# Patient Record
Sex: FEMALE | Race: WHITE | NOT HISPANIC OR LATINO | Employment: OTHER | URBAN - METROPOLITAN AREA
[De-identification: names, ages, dates, MRNs, and addresses within clinical notes are randomized per-mention and may not be internally consistent; named-entity substitution may affect disease eponyms.]

---

## 2017-12-12 ENCOUNTER — CLINICAL SUPPORT (OUTPATIENT)
Dept: SPEECH THERAPY | Facility: HOSPITAL | Age: 77
End: 2017-12-12
Attending: OTOLARYNGOLOGY
Payer: MEDICARE

## 2017-12-12 ENCOUNTER — OFFICE VISIT (OUTPATIENT)
Dept: OTOLARYNGOLOGY | Facility: CLINIC | Age: 77
End: 2017-12-12
Payer: MEDICARE

## 2017-12-12 VITALS — HEART RATE: 77 BPM | DIASTOLIC BLOOD PRESSURE: 71 MMHG | WEIGHT: 119.25 LBS | SYSTOLIC BLOOD PRESSURE: 138 MMHG

## 2017-12-12 DIAGNOSIS — J38.3 VOCAL FOLD ATROPHY: ICD-10-CM

## 2017-12-12 DIAGNOSIS — R49.9 VOICE DISTURBANCE: ICD-10-CM

## 2017-12-12 DIAGNOSIS — R05.9 COUGH: ICD-10-CM

## 2017-12-12 DIAGNOSIS — R49.8 HYPOPHONIA: ICD-10-CM

## 2017-12-12 DIAGNOSIS — R49.9 VOICE DISTURBANCE: Primary | ICD-10-CM

## 2017-12-12 DIAGNOSIS — K21.9 LARYNGOPHARYNGEAL REFLUX: ICD-10-CM

## 2017-12-12 PROCEDURE — 92524 BEHAVRAL QUALIT ANALYS VOICE: CPT | Mod: GN | Performed by: SPEECH-LANGUAGE PATHOLOGIST

## 2017-12-12 PROCEDURE — 99999 PR PBB SHADOW E&M-NEW PATIENT-LVL III: CPT | Mod: PBBFAC,,, | Performed by: OTOLARYNGOLOGY

## 2017-12-12 PROCEDURE — 99204 OFFICE O/P NEW MOD 45 MIN: CPT | Mod: 25,S$PBB,, | Performed by: OTOLARYNGOLOGY

## 2017-12-12 PROCEDURE — G9172 VOICE GOAL STATUS: HCPCS | Mod: GN,CH | Performed by: SPEECH-LANGUAGE PATHOLOGIST

## 2017-12-12 PROCEDURE — 99203 OFFICE O/P NEW LOW 30 MIN: CPT | Mod: PBBFAC | Performed by: OTOLARYNGOLOGY

## 2017-12-12 PROCEDURE — G9171 VOICE CURRENT STATUS: HCPCS | Mod: GN,CJ | Performed by: SPEECH-LANGUAGE PATHOLOGIST

## 2017-12-12 PROCEDURE — 31579 LARYNGOSCOPY TELESCOPIC: CPT | Mod: S$PBB,,, | Performed by: OTOLARYNGOLOGY

## 2017-12-12 PROCEDURE — 31579 LARYNGOSCOPY TELESCOPIC: CPT | Mod: PBBFAC | Performed by: OTOLARYNGOLOGY

## 2017-12-12 RX ORDER — OMEPRAZOLE 40 MG/1
40 CAPSULE, DELAYED RELEASE ORAL
Qty: 90 CAPSULE | Refills: 0 | Status: SHIPPED | OUTPATIENT
Start: 2017-12-12 | End: 2018-03-12

## 2017-12-12 RX ORDER — PRAVASTATIN SODIUM 40 MG/1
40 TABLET ORAL DAILY
COMMUNITY

## 2017-12-12 RX ORDER — URINARY ANTISEPTIC ANTISPASMODIC 81.6; 40.8; 10.8; .12 MG/1; MG/1; MG/1; MG/1
TABLET ORAL DAILY
COMMUNITY

## 2017-12-12 RX ORDER — FLUTICASONE PROPIONATE 110 UG/1
1 AEROSOL, METERED RESPIRATORY (INHALATION) ONCE AS NEEDED
COMMUNITY

## 2017-12-12 RX ORDER — DULOXETIN HYDROCHLORIDE 60 MG/1
60 CAPSULE, DELAYED RELEASE ORAL DAILY
COMMUNITY

## 2017-12-12 RX ORDER — POLYETHYLENE GLYCOL 3350 17 G/17G
POWDER, FOR SOLUTION ORAL DAILY
COMMUNITY

## 2017-12-12 RX ORDER — ESTRADIOL 0.1 MG/G
CREAM VAGINAL DAILY
COMMUNITY

## 2017-12-12 RX ORDER — IPRATROPIUM BROMIDE 21 UG/1
2 SPRAY, METERED NASAL
COMMUNITY

## 2017-12-12 RX ORDER — DIAZEPAM 5 MG/1
5 TABLET ORAL EVERY 6 HOURS PRN
COMMUNITY

## 2017-12-12 RX ORDER — DONEPEZIL HYDROCHLORIDE 10 MG/1
10 TABLET, FILM COATED ORAL NIGHTLY
COMMUNITY

## 2017-12-12 NOTE — PROGRESS NOTES
[facility]  Department of Otorhinolaryngology and Communication Sciences    Deysi Simeon is a 77 y.o. female who [facility] the [facility] for consultation at the kind request of Dr. Grant Hernandez for further management of hoarseness.     She complains of hoarseness, throat irritation when she talks/swallows, coughing. She complains of odynophonia and vocal strain. It is raspier and lower volume, lower pitch. She does say she has always had a low volume voice. Onset was gradual. Duration is 4 years. Time course is constant. Symptoms are worsening. She denies any exacerbating factors. She denies any alleviating factors. Associated symptoms include coughing, throat irritation.     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  Post-menapausal symptoms.  Denies coughing/choking with foods or liquids, but does report coughing on her own saliva or from irritation every now and then.  Dry throat clearing/cough.  Endorses vocal strain.  Used to have tremors, helped by medication.  Has never had voice therapy.  Last time she saw Dr. Hernandez about 2 years ago.  She used to play tennis and golf, but hasn't been playing much since she was having leg pain.      Esophagram 9/23/2014: moderate GERD. She denies typical GERD symptoms.    Triggers for cough: unknown; denies any of the following to be triggers  - voice use  - heavy breathing  - laughing  - eating  - lying down flat  - cold temperatures  - strong odor    Does not cough at night. On aricept and cymbalta. Tried gabapentin in the past for another reason - had a bad reaction. Has not taken tramadol    Voice Handicap Index-10 (VHI-10) score is 22.   Reflux Symptom Index (RSI) score is 18.   Eating Assessment Tool-10 (EAT-10) score is 0.   Dyspnea Index (DI) score is 0.  Cough Severity Index (CSI) score is 3.    Past Medical History  Breast cancer - 8 years ago  Post menapausal syndrome    Past Surgical History  Partial hysterectomy   Breast  surgery  Appendectomy  Hemorrhoidectomy    Family History  Brothers - lung cancer (smokers)  Dad - HD    Social History  She reports that she has quit smoking. She has never used smokeless tobacco.    Allergies  She is allergic to lortab [hydrocodone-acetaminophen] and neosporin [benzalkonium chloride].    Medications  She has a current medication list which includes the following prescription(s): diazepam, donepezil, duloxetine, estradiol, fluticasone, ipratropium, methen-sod phos-meth blue-hyos, polyethylene glycol, and pravastatin.    Review of Systems   Constitutional: Negative for fever.   HENT: Negative for sore throat.    Eyes: Positive for visual disturbance.   Respiratory: Negative for wheezing.    Cardiovascular: Negative for chest pain.   Gastrointestinal: Negative for nausea.   Musculoskeletal: Positive for arthralgias.   Skin: Negative for rash.   Neurological: Positive for tremors.   Hematological: Does not bruise/bleed easily.   Psychiatric/Behavioral: The patient is nervous/anxious.           Objective:     VS reviewed     Physical Exam    Constitutional: comfortable, well dressed  Psychiatric: appropriate affect  Respiratory: comfortably breathing, symmetric chest rise, no stridor  Voice: severely low volume, strain; decreased breath support  Cardiovascular: upper extremities non-edematous  Lymphatic: no cervical lymphadenopathy  Neurologic: alert and oriented to time, place, person, and situation; cranial nerves 3-12 grossly intact  Head: normocephalic  Eyes: conjunctivae and sclerae clear  Ears: normal pinnae, normal external auditory canals, tympanic membranes intact  Nose: mucosa pink and noncongested, no masses, no mucopurulence, no polyps  Oral cavity / oropharynx: no mucosal lesions  Neck: soft, full range of motion, laryngotracheal complex palpable with appropriate landmarks, larynx elevates on swallowing  Indirect laryngoscopy: limited due to gag    Procedure  Rigid Laryngeal  Videostroboscopy (94772): Laryngeal videostroboscopy is indicated to assess the laryngeal vibratory biomechanics and vocal fold oscillation, which cannot be assessed with a plain light examination. This was carried out with a 70 degree endoscope. After verbal consent was obtained, the patient was positioned and the tongue was gently secured with a gauze sponge. The endoscope was passed transorally and positioned to image the larynx and hypopharynx in detail. The following features were examined: laryngeal and hypopharyngeal masses; vocal fold range and symmetry of motion; laryngeal mucosal edema, erythema, inflammation, and hydration; salivary pooling; and gross laryngeal sensation. During phonation, the vocal folds were assessed for glottal closure; mucosal wave; vocal fold lesions; vibratory periodicity, amplitude, and phase symmetry; and vertical height match. The equipment was removed. The patient tolerated the procedure well without complication. 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 She has additional symptoms which may be attributable to reflux, with significant GE reflux noted in barium esophagram.       Plan:        I had a discussion with the patient and her  regarding her condition and the further workup and management options.      SLP voice evaluation and subsequent therapy sessions are medically necessary for restoration of laryngeal function. She had her initial session in conjunction with this visit.    I educated the patient on the impact of reflux on laryngopharyngeal disorders and provided suggestions on diet and lifestyle modifications that may help improve control of  ongoing reflux. After discussing the risks and benefits, we decided on a trial of Rx-strength combination PPI/H2 blocker.     She will follow up with me in 2 month(s). Depending on her progress, she may benefit from vocal fold injection augmentation and/or GI referral.    All questions were answered, and the patient is in agreement with the above.     Joe Israel M.D.  [facility]  Department of Otorhinolaryngology and Communication Sciences

## 2017-12-12 NOTE — LETTER
December 15, 2017      Garnt Hernandez MD  [address] [place] [address]           [facility]  [address]  [facility] [address]  Phone: [phone]  Fax: [phone]          Patient: Deysi Simeon   MR Number: [id number]   YOB: 1940   Date of Visit: 12/12/2017       Dear Dr. Grant Hernandez:    Thank you for referring Deysi Simeon to me for evaluation. Attached you will find relevant portions of my assessment and plan of care.    If you have questions, please do not hesitate to call me. I look forward to following Deysi Simeon along with you.    Sincerely,    Joe Israel MD    Enclosure  CC:  No Recipients    If you would like to receive this communication electronically, please contact [email or web address] or [phone] to request more information on [employer] Link access.    For providers and/or their staff who would like to refer a patient to [facility], please contact us through our one-stop-shop provider referral line, [facility], at [phone].    If you feel you have received this communication in error or would no longer like to receive these types of communications, please e-mail [email or web address]

## 2017-12-12 NOTE — PROGRESS NOTES
Referring provider: Dr. Joe Israel  Reason for visit:  Behavioral and qualitative analysis of voice and resonance (CPT 53523)    Subjective / History    Deysi Simeon is a 77 y.o. female referred for voice evaluation (CPT 10125) by Dr. Israel.  She presents with complaints of hoarseness, throat irritation, and coughing which began several years ago (~4).  The patient also endorses: vocal strain, raspiness, low volume, fatigue with use and pain with use.  She is a former  but is now retired; she reports she always had a strong voice and never had to repeat herself, but admits day to day that she is fairly soft-spoken.   She used to play tennis and golf, but hasn't been playing much since she was having leg pain.  She has been less active.  She has a difficult time being heard in a noisy environment.    Esophagram 9/23/2014: moderate GERD. She denies typical GE symptoms.  See Dr. Israel's note for stroboscopy findings.  No masses, paralysis, or paresis.   Swallowing: no c/o   Breathing: throat clearing    Smoking: quit  Reflux: yes, per esophagram    No past medical history on file.  Current Outpatient Prescriptions on File Prior to Visit   Medication Sig Dispense Refill    diazePAM (VALIUM) 5 MG tablet Take 5 mg by mouth every 6 (six) hours as needed for Anxiety.      donepezil (ARICEPT) 10 MG tablet Take 10 mg by mouth every evening.      DULoxetine (CYMBALTA) 60 MG capsule Take 60 mg by mouth once daily.      estradiol (ESTRACE) 0.01 % (0.1 mg/gram) vaginal cream Place vaginally once daily.      fluticasone (FLOVENT HFA) 110 mcg/actuation inhaler Inhale 1 puff into the lungs once as needed. Controller      ipratropium (ATROVENT) 0.03 % nasal spray 2 sprays by Nasal route every 12 (twelve) hours.      methen-sod phos-meth blue-hyos (UROGESIC-BLUE) 81.6-40.8-0.12 mg Tab Take by mouth Daily.      omeprazole (PRILOSEC) 40 MG capsule Take 1 capsule (40 mg total) by mouth before dinner. 90  "capsule 0    polyethylene glycol (GLYCOLAX) 17 gram PwPk Take by mouth once daily.      pravastatin (PRAVACHOL) 40 MG tablet Take 40 mg by mouth once daily.      ranitidine (ZANTAC) 300 MG tablet Take 1 tablet (300 mg total) by mouth every evening. 90 tablet 0     No current facility-administered medications on file prior to visit.        Objective    Perceptual/behavioral assessment  -CAPE-V Overall Score: 30  -Quality: appropriate for age and gender  -Volume: decreased projection  -Pitch: appropriate for age and gender  -Flexibility: diminished  -Habitual respiratory pattern: chest/clavicular    Acoustic assessment  Multi-Dimensional Voice Program (MDVP) Analysis (sustained "ah")   Baseline Post-trial tx Gender-matched norms (F)   Average fundamental frequency (Fo) 174.825 Hz 221.818 Hz Norm/SD: 243.97 / 27.46   Relative average perturbation 1.56% 0.581% Norm/SD: 0.378 / 0.21   Shimmer percent 2.822% 1.677% Norm/SD: 1.997 / 0.79   Noise to harmonic ratio 0.131 0.1 Norm/SD: 0.112 / 0.01   Voice turbulence index 0.013 0.028 Norm/SD: 0.046 / 0.012     Patient self-perception scales  -Voice Handicap Index-10 (completed to assess self-perceived handicap associated with dysphonia; >11 considered abnormal): 22  -Reflux Symptom Index (completed to assess the possible presence and/or severity of LPR symptoms and any relationship between this condition and the pt's dysphagia; score of ~15 may indicate LPR): 18    Education / Stimulability Trials   Discussed importance of vocal hygiene including: hydration. Patient was stimulable for improved voice using PhoRTE exercises.   Pt was very stimulable with cues to practice PhoRTE exercises using her " voice."  Phonation resistance training exercises (PhoRTE [Shaye & Chiquis, 2013]): to be practiced 2x/day, 7 days/week  - Sustain the vowel /a/ with a loud, energized voice for as long as possible.  - Glide from modal pitch to high pitch on /a/ using a loud, energized " voice.  - Glide from modal pitch to low pitch on the vowel /a/ using a loud, energized voice.  - Project selected functional phrases using a loud and higher-pitched voice, like calling over a fence.  - Project selected functional phrases using a strong, authoritative voice at a low pitch level.  Encouraged daily practice.      Functional goals  Length Status Goal   Long term Initiated Patient and clinician will facilitate changes in vocal function in order to restore functional use of voice for daily occupational, social, and emotional demands.    Long term Initiated Patient will maximize efficiency of the vocal mechanism relative to existing laryngeal disorder through coordinating subsystems of voice within 12 weeks as evidenced by patient report and SLP observations.   Long term Initiated Patient will improve coordination of respiration and phonation for efficient vocal production at a conversational level.    Short term Initiated Patient will complete PhoRTE 2x daily to strengthen and balance the intrinsic laryngeal musculature and maximize glottic closure without medial hyperfunction.   Short term Initiated Patient will improve the quality, efficiency, and ease of phonation through resonant and/or airflow exercises from the syllable to conversation level with 80% accuracy.   Short term Initiated Patient will discriminate between easy and strained phonation with 80% accuracy.    Short term Initiated Patient will demonstrate the ability to increase awareness of voicing behavior through self-monitoring to facilitate generalization in functional speaking situations with 80% accuracy.        Assessment     Deysi Simeon presents with moderate dysphonia.  Diagnoses of Voice disturbance, Vocal fold atrophy, and Hypophonia were pertinent to this visit.  Prognosis for continued improvement is good.     Recommendations / POC    Recommend 2-3 sessions of voice/speech therapy over 2-4 weeks with a speech-language  pathologist to optimize glottal postures for improved vocal function, vocal efficiency, and ease of phonation.  She should continue the exercises as discussed in session and should contact me with any further questions.  She will return for voice therapy visit #1 on the date of her f/u with Dr. Israel since she and her  do not like to drive to [place].      G-Codes for Voice  Current status:   - CJ   Projected status:  - CH

## 2017-12-12 NOTE — PATIENT INSTRUCTIONS
ACID REFLUX   What is acid reflux?    When we eat, food passes from the throat and into the stomach through a tube called the esophagus. At the bottom of the esophagus is a ring of muscles that acts as a valve between the esophagus and stomach, called the lower esophageal sphincter. Smoking, alcohol, and certain types of food may weaken the sphincter, so it may stop closing properly. The contents in the stomach then may leak back, or reflux, into the esophagus. This problem is called gastroesophageal reflux disease (GERD). Symptoms of GERD include heartburn, belching, regurgitation of stomach contents, and swallowing difficulties.    Sometimes, the stomach acid travels up through the esophagus and spills into the larynx or pharynx (voice box). This is called laryngopharyngeal reflux (LPR) and is irritating to the vocal folds and surrounding tissues. Often, patients with LPR do not experience heartburn as a symptom. More commonly, symptoms of LPR include hoarseness, excessive mucous resulting in frequent throat clearing, post-nasal drip, coughing, throat soreness or burning, choking episodes, difficulty swallowing, and sensation of a lump in the throat.     How is acid reflux treated?   Treatment for acid reflux can involve any combination of medication, lifestyle modifications, and surgery.   · Medications. Your doctor may prescribe a proton pump inhibitor (PPI) or an H2 blocker. If you are prescribed a PPI, take in on an empty stomach in the morning 30 minutes prior to eating breakfast. Keep in mind that it may take 4-6 weeks before symptoms begin to resolve, so do not stop medications without consulting your doctor.   · Lifestyle and dietary modifications. Eat smaller meals at a slower pace. Avoid over-eating. If you are overweight, try to lose weight. Do not lie down or exercise directly after eating; eat your last meal of the day at least 2-3 hours prior to going to sleep. Avoid tight-fitting clothes. If  you are a smoker, reduce or quit smoking. Elevate your head of bed 4-6 inches by putting phone books under the legs at the head of your bed or buy a wedge pillow, but do not use more than two regular pillows as this causes the body to curl and compresses your stomach.     Food group Foods to avoid to reduce reflux   Beverages  Whole milk, 2% milk, chocolate milk/hot chocolate, alcohol, coffee (regular and decaf), caffeinated tea, mint tea, carbonated beverages, citrus juice    Breads/grains Commercial sweet rolls, doughnuts, croissants, and other high-fat pastries    Fruits and vegetables Fried or cream-style vegetables, tomatoes, tomato-based products, citrus fruits, hot peppers    Soups and seasonings Cream, cheese, tomato-based soups, vinegar    Meats and proteins Fatty or fried meat/fish, purvis, sausage, pepperoni, lunch meat, fried eggs    Fats and oil Lard, purvis drippings, salt pork, meat drippings, gravies, highly seasoned salad dressings, nuts    Sweets/desserts Anything made with or from chocolate, peppermint, spearmint, whole milk, or cream; high-fat pastries, gum, hard candy         Omeprazole; Sodium Bicarbonate oral capsule  What is this medicine?  OMEPRAZOLE; SODIUM BICARBONATE (oh ME pray zol; SUHAIL berta um bye KEVIN bon ate) prevents the production of acid in the stomach. It is used to treat gastroesophageal reflux disease (GERD), ulcers, and inflammation of the esophagus.  How should I use this medicine?  Take this medicine by mouth with a glass of water. Do not take with other drinks or foods. Follow the directions on the prescription label. Do not cut, crush or chew the capsules. Take this medicine on an empty stomach, at least 1 hour before a meal. Take your medicine at regular intervals. Do not take your medicine more often than directed. Do not stop taking except on your doctor's advice.  Talk to your pediatrician regarding the use of this medicine in children. Special care may be needed.  What  side effects may I notice from receiving this medicine?  Side effects that you should report to your doctor or health care professional as soon as possible:  · allergic reactions like skin rash, itching or hives, swelling of the face, lips, or tongue  · bone, muscle or joint pain  · breathing problems  · chest pain  · dark urine  · diarrhea  · dizziness  · fast, irregular heartbeat  · feeling faint or lightheaded, falls  · fever or sore throat  · high blood pressure  · palpitations  · muscle spasms  · redness, blistering, peeling or loosening of the skin, including inside the mouth  · seizures  · stomach pain  · swelling of the ankles, legs  · tremors  · trouble passing urine or change in the amount of urine  · unusual bleeding, bruising  · unusually weak or tired  · yellowing of the eyes or skin  Side effects that usually do not require medical attention (report to your doctor or health care professional if they continue or are bothersome):  · constipation  · dry mouth  · headache  · loose stools  · nausea  What may interact with this medicine?  Do not take this medicine with any of the following medications:  · atazanavir  · clopidogrel  · nelfinavir  This medicine may also interact with the following medications:  · antifungals like itraconazole, ketoconazole, and voriconazole  · antivirals like delavirdine, efavirenz, indinavir, saquinavir  · certain medicines that treat or prevent blood clots like warfarin  · cyclosporine  · dasatinib  · digoxin  · disulfiram  · diuretics  · iron supplements  · medicines for anxiety, panic, and sleep like diazepam  · medicines for seizures like carbamazepine, phenobarbital, phenytoin  · methotrexate  · mycophenolate mofetil  · other medicines for stomach acid  · tacrolimus  · tolmetin  · vitamin B12  What if I miss a dose?  If you miss a dose, take it as soon as you can. If it is almost time for your next dose, take only that dose. Do not take double or extra doses.  Where  should I keep my medicine?  Keep out of the reach of children.  Store at room temperature between 15 and 30 degrees C (59 and 86 degrees F). Protect from moisture. Throw away any unused medicine after the expiration date.  What should I tell my health care provider before I take this medicine?  They need to know if you have any of these conditions:  · Bartter's syndrome  · kidney disease  · history of low levels of calcium, magnesium, or potassium in the blood  · low salt diet  · metabolic alkalosis  · an unusual reaction to omeprazole, sodium bicarbonate, other medicines, foods, dyes, or preservatives  · pregnant or trying to get pregnant  · breast-feeding  What should I watch for while using this medicine?  Visit your doctor for regular check ups. Tell your doctor if your symptoms do not get better or if they get worse.  Do not treat diarrhea with over the counter products. Contact your doctor if you have diarrhea that lasts more than 2 days or if it is severe and watery.  You may need blood work done while you are taking this medicine.  NOTE:This sheet is a summary. It may not cover all possible information. If you have questions about this medicine, talk to your doctor, pharmacist, or health care provider. Copyright© 2017 [employer]        Ranitidine tablets or capsules  What is this medicine?  RANITIDINE (guillermina anders) is a type of antihistamine that blocks the release of stomach acid. It is used to treat stomach or intestinal ulcers. It can relieve ulcer pain and discomfort, and the heartburn from acid reflux.  How should I use this medicine?  Take this medicine by mouth with a glass of water. Follow the directions on the prescription label. If you only take this medicine once a day, take it at bedtime. Take your medicine at regular intervals. Do not take your medicine more often than directed. Do not stop taking except on your doctor's advice.  Talk to your pediatrician regarding the use of this medicine  in children. Special care may be needed.  What side effects may I notice from receiving this medicine?  Side effects that you should report to your doctor or health care professional as soon as possible:  · agitation, nervousness, depression, hallucinations  · allergic reactions like skin rash, itching or hives, swelling of the face, lips, or tongue  · breast enlargement in both males and females  · breathing problems  · redness, blistering, peeling or loosening of the skin, including inside the mouth  · unusual bleeding or bruising  · unusually weak or tired  · vomiting  · yellowing of the skin or eyes  Side effects that usually do not require medical attention (report to your doctor or health care professional if they continue or are bothersome):  · constipation or diarrhea  · dizziness  · headache  · nausea  What may interact with this medicine?  · atazanavir  · delavirdine  · gefitinib  · glipizide  · ketoconazole  · midazolam  · procainamide  · propantheline  · triazolam  · warfarin  What if I miss a dose?  If you miss a dose, take it as soon as you can. If it is almost time for your next dose, take only that dose. Do not take double or extra doses.  Where should I keep my medicine?  Keep out of the reach of children.  Store at room temperature between 15 and 30 degrees C (59 and 86 degrees F). Protect from light and moisture. Keep container tightly closed. Throw away any unused medicine after the expiration date.  What should I tell my health care provider before I take this medicine?  They need to know if you have any of these conditions:  · kidney disease  · liver disease  · porphyria  · an unusual or allergic reaction to ranitidine, other medicines, foods, dyes, or preservatives  · pregnant or trying to get pregnant  · breast-feeding  What should I watch for while using this medicine?  Tell your doctor or health care professional if your condition does not start to get better or gets worse. You may need to  take this medicine for several days as prescribed before your symptoms get better. Finish the full course of tablets prescribed, even if you feel better.  Do not smoke cigarettes or drink alcohol. These increase irritation in your stomach and can lengthen the time it will take for ulcers to heal. Cigarettes and alcohol can also make acid reflux or heartburn worse.  If you get black, tarry stools or vomit up what looks like coffee grounds, call your doctor or health care professional at once. You may have a bleeding ulcer.  NOTE:This sheet is a summary. It may not cover all possible information. If you have questions about this medicine, talk to your doctor, pharmacist, or health care provider. Copyright© 2017 [employer]            WHAT TO EXPECT FROM VOICE THERAPY    Purpose  The purpose of voice therapy is to help you find a better, more efficient way to use your voice or to reduce symptoms such as coughing, throat clearing, or difficulty breathing.  Depending on your symptoms, you may learn how to produce clearer voice quality, how to reduce fatigue or pain associated with speaking, how to take care of your voice, and how to eliminate chronic coughing or throat clearing.      Process: Evaluation  First, you may go through some initial testing.  In most cases, a videostroboscopy will be performed in order to allow your speech pathologist and your physician to look at your vocal cords to aid in deciding if you would benefit from voice therapy.  Next, you may work with the speech pathologist to assess the current capabilities of your voice.  Following evaluation, your speech pathologist will design a therapeutic plan to improve your voice as well as other symptoms that may bother you.  At the time of evaluation, your speech pathologist may provide you with exercises to try at home.      Process: Therapy  Most patients benefit from 2-8 sessions over 1-3 months.  Voice therapy involves changing the behavior of your  vocal cords and speaking habits, so it is very important that you attend your appointments and do home practices as instructed by your speech pathologist.  Home practice and participation in therapy are critical to meeting your desired voice goals, so of course, we are able to work with you to schedule appointments that are convenient for you.

## 2017-12-13 ENCOUNTER — TELEPHONE (OUTPATIENT)
Dept: SPEECH THERAPY | Facility: HOSPITAL | Age: 77
End: 2017-12-13

## 2017-12-13 DIAGNOSIS — R49.9 VOICE DISTURBANCE: ICD-10-CM

## 2017-12-13 DIAGNOSIS — J38.3 VOCAL FOLD ATROPHY: Primary | ICD-10-CM

## 2017-12-13 DIAGNOSIS — R49.8 HYPOPHONIA: ICD-10-CM

## 2022-09-15 ENCOUNTER — TELEPHONE (OUTPATIENT)
Dept: OBSTETRICS AND GYNECOLOGY | Facility: CLINIC | Age: 82
End: 2022-09-15
Payer: MEDICARE

## 2022-10-06 ENCOUNTER — HOSPITAL ENCOUNTER (OUTPATIENT)
Dept: RADIOLOGY | Facility: CLINIC | Age: 82
Discharge: HOME | End: 2022-10-06
Attending: OBSTETRICS & GYNECOLOGY
Payer: MEDICARE

## 2022-10-06 VITALS — HEIGHT: 68 IN | BODY MASS INDEX: 18.04 KG/M2 | WEIGHT: 119 LBS

## 2022-10-06 DIAGNOSIS — Z12.31 ENCOUNTER FOR SCREENING MAMMOGRAM FOR MALIGNANT NEOPLASM OF BREAST: ICD-10-CM

## 2022-10-06 PROCEDURE — 77067 MAMMO DIGITAL SCREENING BILAT WITH TOMO: ICD-10-PCS | Mod: S$GLB,,, | Performed by: RADIOLOGY

## 2022-10-06 PROCEDURE — 77067 SCR MAMMO BI INCL CAD: CPT | Mod: S$GLB,,, | Performed by: RADIOLOGY

## 2022-10-06 PROCEDURE — 77063 BREAST TOMOSYNTHESIS BI: CPT | Mod: S$GLB,,, | Performed by: RADIOLOGY

## 2022-10-06 PROCEDURE — 77063 MAMMO DIGITAL SCREENING BILAT WITH TOMO: ICD-10-PCS | Mod: S$GLB,,, | Performed by: RADIOLOGY
